# Patient Record
Sex: MALE | Race: WHITE | Employment: OTHER | URBAN - METROPOLITAN AREA
[De-identification: names, ages, dates, MRNs, and addresses within clinical notes are randomized per-mention and may not be internally consistent; named-entity substitution may affect disease eponyms.]

---

## 2018-05-10 PROBLEM — I10 ESSENTIAL HYPERTENSION: Status: ACTIVE | Noted: 2018-05-10

## 2018-05-10 PROBLEM — I25.810 CORONARY ARTERY DISEASE INVOLVING CORONARY BYPASS GRAFT OF NATIVE HEART WITHOUT ANGINA PECTORIS: Status: ACTIVE | Noted: 2018-05-10

## 2018-05-10 PROBLEM — I65.22 STENOSIS OF LEFT CAROTID ARTERY: Status: ACTIVE | Noted: 2018-05-10

## 2018-05-10 PROBLEM — E78.2 MIXED HYPERLIPIDEMIA: Status: ACTIVE | Noted: 2018-05-10

## 2018-05-10 PROBLEM — I67.9 CEREBROVASCULAR SMALL VESSEL DISEASE: Status: ACTIVE | Noted: 2018-05-10

## 2022-06-17 ENCOUNTER — TELEPHONE (OUTPATIENT)
Dept: SURGERY | Facility: CLINIC | Age: 87
End: 2022-06-17

## 2022-06-17 NOTE — TELEPHONE ENCOUNTER
dr. Jos Britt, son called. Text sent to Dr. Anshu Gordillo. Message to set up video appointment for Monday.   Please call to schedule

## 2022-06-20 ENCOUNTER — TELEMEDICINE (OUTPATIENT)
Dept: SURGERY | Facility: CLINIC | Age: 87
End: 2022-06-20
Payer: MEDICARE

## 2022-06-20 VITALS — WEIGHT: 176 LBS | BODY MASS INDEX: 24.64 KG/M2 | HEIGHT: 71 IN

## 2022-06-20 DIAGNOSIS — K40.30 INCARCERATED LEFT INGUINAL HERNIA: Primary | ICD-10-CM

## 2022-06-20 PROCEDURE — 3008F BODY MASS INDEX DOCD: CPT | Performed by: SURGERY

## 2022-06-20 PROCEDURE — 99203 OFFICE O/P NEW LOW 30 MIN: CPT | Performed by: SURGERY

## 2022-06-22 ENCOUNTER — TELEPHONE (OUTPATIENT)
Dept: SURGERY | Facility: CLINIC | Age: 87
End: 2022-06-22

## 2022-06-22 NOTE — TELEPHONE ENCOUNTER
6-  Received cardi clearance from Dr. Fariha Ross, by fax. I faxed copy to PAT 6-22-22. I set copy on MD VERONICA's desk at OP. I sent copy to scanning. URGENT surgery scheduled for 6-25-22.    SHIRA

## 2022-06-22 NOTE — TELEPHONE ENCOUNTER
PC from spouse. She faxed cardi medical records to Gen Surg. These were received and sent to scanning. Dr VERONICA MD reviewed.       SHIRA

## 2022-06-24 ENCOUNTER — TELEPHONE (OUTPATIENT)
Dept: SURGERY | Facility: CLINIC | Age: 87
End: 2022-06-24

## 2022-06-24 NOTE — TELEPHONE ENCOUNTER
RC, spoke to patient. All questions answered. Patient Waiting for P A T to call with red.   SHIRA Simple / Intermediate / Complex Repair - Final Wound Length In Cm: 0

## 2022-06-25 ENCOUNTER — ANESTHESIA (OUTPATIENT)
Dept: SURGERY | Facility: HOSPITAL | Age: 87
End: 2022-06-25
Payer: MEDICARE

## 2022-06-25 ENCOUNTER — HOSPITAL ENCOUNTER (OUTPATIENT)
Facility: HOSPITAL | Age: 87
Setting detail: HOSPITAL OUTPATIENT SURGERY
Discharge: HOME | End: 2022-06-25
Attending: SURGERY | Admitting: SURGERY
Payer: MEDICARE

## 2022-06-25 ENCOUNTER — ANESTHESIA EVENT (OUTPATIENT)
Dept: SURGERY | Facility: HOSPITAL | Age: 87
End: 2022-06-25
Payer: MEDICARE

## 2022-06-25 VITALS
TEMPERATURE: 98 F | BODY MASS INDEX: 24.2 KG/M2 | HEART RATE: 83 BPM | HEIGHT: 71 IN | OXYGEN SATURATION: 97 % | SYSTOLIC BLOOD PRESSURE: 163 MMHG | WEIGHT: 172.88 LBS | RESPIRATION RATE: 16 BRPM | DIASTOLIC BLOOD PRESSURE: 67 MMHG

## 2022-06-25 DIAGNOSIS — K40.30 INCARCERATED LEFT INGUINAL HERNIA: ICD-10-CM

## 2022-06-25 LAB — SARS-COV-2 RNA RESP QL NAA+PROBE: NOT DETECTED

## 2022-06-25 PROCEDURE — 0YU64JZ SUPPLEMENT LEFT INGUINAL REGION WITH SYNTHETIC SUBSTITUTE, PERCUTANEOUS ENDOSCOPIC APPROACH: ICD-10-PCS | Performed by: SURGERY

## 2022-06-25 PROCEDURE — 8E0W4CZ ROBOTIC ASSISTED PROCEDURE OF TRUNK REGION, PERCUTANEOUS ENDOSCOPIC APPROACH: ICD-10-PCS | Performed by: SURGERY

## 2022-06-25 PROCEDURE — 88302 TISSUE EXAM BY PATHOLOGIST: CPT | Performed by: SURGERY

## 2022-06-25 DEVICE — PROGRIP MESH: Type: IMPLANTABLE DEVICE | Site: INGUINAL | Status: FUNCTIONAL

## 2022-06-25 RX ORDER — MORPHINE SULFATE 4 MG/ML
2 INJECTION, SOLUTION INTRAMUSCULAR; INTRAVENOUS EVERY 10 MIN PRN
Status: DISCONTINUED | OUTPATIENT
Start: 2022-06-25 | End: 2022-06-25

## 2022-06-25 RX ORDER — HYDROMORPHONE HYDROCHLORIDE 1 MG/ML
0.6 INJECTION, SOLUTION INTRAMUSCULAR; INTRAVENOUS; SUBCUTANEOUS EVERY 5 MIN PRN
Status: DISCONTINUED | OUTPATIENT
Start: 2022-06-25 | End: 2022-06-25

## 2022-06-25 RX ORDER — HYDROMORPHONE HYDROCHLORIDE 1 MG/ML
0.2 INJECTION, SOLUTION INTRAMUSCULAR; INTRAVENOUS; SUBCUTANEOUS EVERY 5 MIN PRN
Status: DISCONTINUED | OUTPATIENT
Start: 2022-06-25 | End: 2022-06-25

## 2022-06-25 RX ORDER — ONDANSETRON 2 MG/ML
4 INJECTION INTRAMUSCULAR; INTRAVENOUS EVERY 6 HOURS PRN
Status: DISCONTINUED | OUTPATIENT
Start: 2022-06-25 | End: 2022-06-25

## 2022-06-25 RX ORDER — ONDANSETRON 2 MG/ML
INJECTION INTRAMUSCULAR; INTRAVENOUS AS NEEDED
Status: DISCONTINUED | OUTPATIENT
Start: 2022-06-25 | End: 2022-06-25 | Stop reason: SURG

## 2022-06-25 RX ORDER — GLYCOPYRROLATE 0.2 MG/ML
INJECTION, SOLUTION INTRAMUSCULAR; INTRAVENOUS AS NEEDED
Status: DISCONTINUED | OUTPATIENT
Start: 2022-06-25 | End: 2022-06-25 | Stop reason: SURG

## 2022-06-25 RX ORDER — DEXAMETHASONE SODIUM PHOSPHATE 4 MG/ML
VIAL (ML) INJECTION AS NEEDED
Status: DISCONTINUED | OUTPATIENT
Start: 2022-06-25 | End: 2022-06-25 | Stop reason: SURG

## 2022-06-25 RX ORDER — MORPHINE SULFATE 4 MG/ML
4 INJECTION, SOLUTION INTRAMUSCULAR; INTRAVENOUS EVERY 10 MIN PRN
Status: DISCONTINUED | OUTPATIENT
Start: 2022-06-25 | End: 2022-06-25

## 2022-06-25 RX ORDER — HYDROMORPHONE HYDROCHLORIDE 1 MG/ML
0.4 INJECTION, SOLUTION INTRAMUSCULAR; INTRAVENOUS; SUBCUTANEOUS EVERY 5 MIN PRN
Status: DISCONTINUED | OUTPATIENT
Start: 2022-06-25 | End: 2022-06-25

## 2022-06-25 RX ORDER — NEOSTIGMINE METHYLSULFATE 1 MG/ML
INJECTION INTRAVENOUS AS NEEDED
Status: DISCONTINUED | OUTPATIENT
Start: 2022-06-25 | End: 2022-06-25 | Stop reason: SURG

## 2022-06-25 RX ORDER — HYDROCODONE BITARTRATE AND ACETAMINOPHEN 5; 325 MG/1; MG/1
1 TABLET ORAL EVERY 6 HOURS PRN
Qty: 10 TABLET | Refills: 0 | Status: SHIPPED | OUTPATIENT
Start: 2022-06-25

## 2022-06-25 RX ORDER — METOCLOPRAMIDE HYDROCHLORIDE 5 MG/ML
10 INJECTION INTRAMUSCULAR; INTRAVENOUS EVERY 8 HOURS PRN
Status: DISCONTINUED | OUTPATIENT
Start: 2022-06-25 | End: 2022-06-25

## 2022-06-25 RX ORDER — LIDOCAINE HYDROCHLORIDE 10 MG/ML
INJECTION, SOLUTION EPIDURAL; INFILTRATION; INTRACAUDAL; PERINEURAL AS NEEDED
Status: DISCONTINUED | OUTPATIENT
Start: 2022-06-25 | End: 2022-06-25 | Stop reason: SURG

## 2022-06-25 RX ORDER — BUPIVACAINE HYDROCHLORIDE 2.5 MG/ML
INJECTION, SOLUTION EPIDURAL; INFILTRATION; INTRACAUDAL AS NEEDED
Status: DISCONTINUED | OUTPATIENT
Start: 2022-06-25 | End: 2022-06-25 | Stop reason: HOSPADM

## 2022-06-25 RX ORDER — SODIUM CHLORIDE, SODIUM LACTATE, POTASSIUM CHLORIDE, CALCIUM CHLORIDE 600; 310; 30; 20 MG/100ML; MG/100ML; MG/100ML; MG/100ML
INJECTION, SOLUTION INTRAVENOUS CONTINUOUS
Status: DISCONTINUED | OUTPATIENT
Start: 2022-06-25 | End: 2022-06-25

## 2022-06-25 RX ORDER — CEFAZOLIN SODIUM/WATER 2 G/20 ML
2 SYRINGE (ML) INTRAVENOUS ONCE
Status: COMPLETED | OUTPATIENT
Start: 2022-06-25 | End: 2022-06-25

## 2022-06-25 RX ORDER — IBUPROFEN 600 MG/1
600 TABLET ORAL EVERY 6 HOURS PRN
Qty: 15 TABLET | Refills: 1 | Status: SHIPPED | OUTPATIENT
Start: 2022-06-25 | End: 2022-07-02

## 2022-06-25 RX ORDER — NALOXONE HYDROCHLORIDE 0.4 MG/ML
80 INJECTION, SOLUTION INTRAMUSCULAR; INTRAVENOUS; SUBCUTANEOUS AS NEEDED
Status: DISCONTINUED | OUTPATIENT
Start: 2022-06-25 | End: 2022-06-25

## 2022-06-25 RX ORDER — ROCURONIUM BROMIDE 10 MG/ML
INJECTION, SOLUTION INTRAVENOUS AS NEEDED
Status: DISCONTINUED | OUTPATIENT
Start: 2022-06-25 | End: 2022-06-25 | Stop reason: SURG

## 2022-06-25 RX ORDER — ACETAMINOPHEN 500 MG
1000 TABLET ORAL ONCE
Status: COMPLETED | OUTPATIENT
Start: 2022-06-25 | End: 2022-06-25

## 2022-06-25 RX ORDER — IBUPROFEN 600 MG/1
600 TABLET ORAL ONCE
Status: COMPLETED | OUTPATIENT
Start: 2022-06-25 | End: 2022-06-25

## 2022-06-25 RX ORDER — MORPHINE SULFATE 10 MG/ML
6 INJECTION, SOLUTION INTRAMUSCULAR; INTRAVENOUS EVERY 10 MIN PRN
Status: DISCONTINUED | OUTPATIENT
Start: 2022-06-25 | End: 2022-06-25

## 2022-06-25 RX ADMIN — CEFAZOLIN SODIUM/WATER 2 G: 2 G/20 ML SYRINGE (ML) INTRAVENOUS at 09:39:00

## 2022-06-25 RX ADMIN — DEXAMETHASONE SODIUM PHOSPHATE 4 MG: 4 MG/ML VIAL (ML) INJECTION at 09:32:00

## 2022-06-25 RX ADMIN — LIDOCAINE HYDROCHLORIDE 50 MG: 10 INJECTION, SOLUTION EPIDURAL; INFILTRATION; INTRACAUDAL; PERINEURAL at 09:32:00

## 2022-06-25 RX ADMIN — SODIUM CHLORIDE, SODIUM LACTATE, POTASSIUM CHLORIDE, CALCIUM CHLORIDE: 600; 310; 30; 20 INJECTION, SOLUTION INTRAVENOUS at 10:50:00

## 2022-06-25 RX ADMIN — NEOSTIGMINE METHYLSULFATE 4 MG: 1 INJECTION INTRAVENOUS at 10:44:00

## 2022-06-25 RX ADMIN — ROCURONIUM BROMIDE 50 MG: 10 INJECTION, SOLUTION INTRAVENOUS at 09:32:00

## 2022-06-25 RX ADMIN — ONDANSETRON 4 MG: 2 INJECTION INTRAMUSCULAR; INTRAVENOUS at 09:32:00

## 2022-06-25 RX ADMIN — GLYCOPYRROLATE 0.8 MG: 0.2 INJECTION, SOLUTION INTRAMUSCULAR; INTRAVENOUS at 10:44:00

## 2022-06-25 NOTE — CONSULTS
[facility]    PATIENT'S NAME: Kirk Tariq   ATTENDING PHYSICIAN: Atif Ellington MD   CONSULTING PHYSICIAN: Atif Ellington MD   PATIENT ACCOUNT#:   [de-identified]    LOCATION:  [facility] OR [facility] 3 [facility] 10  MEDICAL RECORD #:   [id number]       YOB: 1935  ADMISSION DATE:       06/25/2022      CONSULT DATE:  06/25/2022    REPORT OF CONSULTATION      REASON FOR CONSULTATION:  Incarcerated recurrent left inguinal hernia. HISTORY OF PRESENT ILLNESS:  The patient is an 60-year-old male who had a left inguinal hernia repair 20 to 30 years ago. Unclear whether mesh was used. He presents now with incarceration, but no strangulation. He has no obstructive symptoms. PAST MEDICAL HISTORY:  Significant for coronary disease, atherosclerosis, hypertension, hyperlipidemia. PAST SURGICAL HISTORY:  CABG. MEDICATIONS:  Please see reconciliation. ALLERGIES:  Heparin. REVIEW OF SYSTEMS:  He is active, looks younger than his stated age. Complains of some pain in the left groin, but otherwise fine. PHYSICAL EXAMINATION:    GENERAL:  He is alert and oriented x3, comfortable, in no distress. HEENT:  Sclerae nonicteric. NECK:  Supple without lymphadenopathy. LUNGS:  Clear to auscultation. HEART:  Regular rate and rhythm. ABDOMEN:  Soft, nontender. Left inguinal hernia identified, no strangulation. EXTREMITIES:  Without lymphedema. IMPRESSION:  Incarcerated recurrent left inguinal hernia. PLAN:  Robotic repair with mesh. Risks, benefits, and options explained. Cardiac clearance obtained. I thank you for this consultation.     Dictated By Atif Ellington MD  d: 06/25/2022 08:55:51  t: 06/25/2022 09:40:26  Job [id number]  RI/

## 2022-06-25 NOTE — ANESTHESIA PROCEDURE NOTES
Airway  Date/Time: 6/25/2022 9:34 AM  Urgency: Elective      General Information and Staff    Patient location during procedure: OR  Anesthesiologist: Vilma Sommer MD  Performed: anesthesiologist     Indications and Patient Condition  Indications for airway management: anesthesia  Spontaneous Ventilation: absent  Sedation level: deep  Preoxygenated: yes  Patient position: sniffing  Mask difficulty assessment: 1 - vent by mask    Final Airway Details  Final airway type: endotracheal airway      Successful airway: ETT  Cuffed: yes   Successful intubation technique: direct laryngoscopy  Endotracheal tube insertion site: oral  Blade: Wick  Blade size: #3  ETT size (mm): 7.5    Cormack-Lehane Classification: grade IIA - partial view of glottis  Placement verified by: chest auscultation and capnometry   Measured from: teeth  Number of attempts at approach: 1

## 2022-06-25 NOTE — ANESTHESIA POSTPROCEDURE EVALUATION
Patient: Marlo Louis    Procedure Summary     Date: 06/25/22 Room / Location: [address] [place] [address] MAIN OR 07 / [address] [place] [address] MAIN OR    Anesthesia Start: 3511 Anesthesia Stop:     Procedure: Robotic Repair recurrent Left Inguinal Hernia with mesh (Left ) Diagnosis:       Incarcerated left inguinal hernia      (Incarcerated left inguinal hernia [K40.30])    Surgeons: Mihir Haley MD Anesthesiologist: Sasha Brantley MD    Anesthesia Type: general ASA Status: 3          Anesthesia Type: general    Vitals Value Taken Time   /82 06/25/22 1055   Temp 97.3 06/25/22 1055   Pulse 93 06/25/22 1054   Resp 16 06/25/22 1055   SpO2 96 % 06/25/22 1054   Vitals shown include unvalidated device data.     [address] [place] [address] AN Post Evaluation:   Patient Evaluated in PACU  Patient Participation: complete - patient participated  Level of Consciousness: awake  Pain Score: 0  Pain Management: adequate  Airway Patency:patent  Dental exam unchanged from preop  Yes    Cardiovascular Status: acceptable  Respiratory Status: acceptable  Postoperative Hydration acceptable      Rommie Kayser, MD  6/25/2022 10:55 AM

## 2022-06-25 NOTE — INTERVAL H&P NOTE
Pre-op Diagnosis: Incarcerated left inguinal hernia [K40.30]    The above referenced H&P was reviewed by Shadia Le MD on 6/25/2022, the patient was examined and no significant changes have occurred in the patient's condition since the H&P was performed. I discussed with the patient and/or legal representative the potential benefits, risks and side effects of this procedure; the likelihood of the patient achieving goals; and potential problems that might occur during recuperation. I discussed reasonable alternatives to the procedure, including risks, benefits and side effects related to the alternatives and risks related to not receiving this procedure. We will proceed with procedure as planned.

## 2022-06-27 NOTE — OPERATIVE REPORT
[facility]    PATIENT'S NAME: Edin Archibald   ATTENDING PHYSICIAN: Oz Logan MD   OPERATING PHYSICIAN: Oz Logan MD   PATIENT ACCOUNT#:   [de-identified]    LOCATION:  [address] 10  MEDICAL RECORD #:   [id number]       YOB: 1935  ADMISSION DATE:       06/25/2022      OPERATION DATE:  06/25/2022    OPERATIVE REPORT    PREOPERATIVE DIAGNOSIS:  Recurrent incarcerated left inguinal hernia. POSTOPERATIVE DIAGNOSIS:  Incarcerated femoral hernia. PROCEDURE:  Robotic repair of incarcerated left femoral hernia, partial omentectomy. ASSISTANT:  SHERIDAN Tee. ESTIMATED BLOOD LOSS:  5 mL. COMPLICATIONS:  None. ANESTHESIA:  General.    DISPOSITION:  To Recovery, tolerated well. INDICATIONS:  Patient is an 81-year-old gentleman who presents with a recurrent incarcerated left inguinal hernia. Detailed informed consent obtained. OPERATIVE TECHNIQUE:  He was taken to surgery, was prepped and draped in the usual sterile fashion. A Veress needle was placed at [facility] and the abdomen insufflated. An 8 mm trocar placed using [employer]. Two additional trocars were placed, and the robot is docked. There is incarcerated omentum within a left recurrent left inguinal defect. There was a small direct inguinal hernia on the right. I could not immediately reduce the omentum. There was quite a bit of edema within the sac. I was able to open the ring superiorly and with some manual retraction from my assistant, the contents were all reduced back into the abdominal cavity. A portion of the omentum, which appeared inflamed, was resected using bipolar and brought out through an Endobag. Peritoneal flaps were elevated widely. Dissection is performed. The Larry ligament, transversalis muscle, and the cord structures are all identified. The sigmoid colon is densely adherent to the lower peritoneal flap. The median umbilical ligament is transected.   The defect appears to be a femoral defect. It is medial to the femoral vein. A 10 x 15 cm ProGrip is placed in the preperitoneal space covering the entire floor with medial overlap. The peritoneum is closed using running V-Loc. All hemostasis maintained. The abdomen irrigated. Trocars removed. The skin closed with 3-0 Monocryl. Marcaine infiltrated for local block.     Dictated By Natasha Horne MD  d: 06/25/2022 10:47:11  t: 06/25/2022 20:53:16  [facility] [id number]  TY/

## (undated) DEVICE — SYSTEM SURGYPAD VELCRO 36IN

## (undated) DEVICE — FENESTRATED BIPOLAR FORCEPS: Brand: ENDOWRIST

## (undated) DEVICE — SUT MONOCRYL 3-0 PS-2 Y497G

## (undated) DEVICE — CANNULA SEAL

## (undated) DEVICE — MEGA SUTURECUT ND: Brand: ENDOWRIST

## (undated) DEVICE — BLADELESS OBTURATOR: Brand: WECK VISTA

## (undated) DEVICE — SUTURE VLOC 90 2-0 9" 2145

## (undated) DEVICE — DRAPE SHEET LAPCHOLE 124X100X7

## (undated) DEVICE — GAMMEX® PI HYBRID SIZE 7, STERILE POWDER-FREE SURGICAL GLOVE, POLYISOPRENE AND NEOPRENE BLEND: Brand: GAMMEX

## (undated) DEVICE — DISPOSABLE SUCTION/IRRIGATOR TUBE SET: Brand: AHTO

## (undated) DEVICE — SUT SILK 2-0 SH K833H

## (undated) DEVICE — INSUFFLATION NEEDLE TO ESTABLISH PNEUMOPERITONEUM.: Brand: INSUFFLATION NEEDLE

## (undated) DEVICE — COLUMN DRAPE

## (undated) DEVICE — ROBOTIC: Brand: MEDLINE INDUSTRIES, INC.

## (undated) DEVICE — MONOPOLAR CURVED SCISSORS: Brand: ENDOWRIST

## (undated) DEVICE — LAPAROVUE VISIBILITY SYSTEM LAPAROSCOPIC SOLUTIONS: Brand: LAPAROVUE

## (undated) DEVICE — TIP COVER ACCESSORY

## (undated) DEVICE — C-ARM: Brand: UNBRANDED

## (undated) DEVICE — ARM DRAPE

## (undated) DEVICE — TISSUE RETRIEVAL SYSTEM: Brand: INZII RETRIEVAL SYSTEM

## (undated) DEVICE — SOLUTION  .9 1000ML BTL